# Patient Record
Sex: FEMALE | Race: WHITE | NOT HISPANIC OR LATINO | Employment: OTHER | URBAN - NONMETROPOLITAN AREA
[De-identification: names, ages, dates, MRNs, and addresses within clinical notes are randomized per-mention and may not be internally consistent; named-entity substitution may affect disease eponyms.]

---

## 2024-05-23 ENCOUNTER — OFFICE VISIT (OUTPATIENT)
Dept: CARDIOLOGY | Facility: CLINIC | Age: 64
End: 2024-05-23
Payer: COMMERCIAL

## 2024-05-23 VITALS
SYSTOLIC BLOOD PRESSURE: 136 MMHG | HEIGHT: 64 IN | HEART RATE: 71 BPM | BODY MASS INDEX: 35 KG/M2 | DIASTOLIC BLOOD PRESSURE: 74 MMHG | WEIGHT: 205 LBS | OXYGEN SATURATION: 98 %

## 2024-05-23 DIAGNOSIS — I10 ESSENTIAL HYPERTENSION: ICD-10-CM

## 2024-05-23 DIAGNOSIS — I38 VALVULAR HEART DISEASE: Primary | ICD-10-CM

## 2024-05-23 DIAGNOSIS — E78.2 MIXED HYPERLIPIDEMIA: ICD-10-CM

## 2024-05-23 PROBLEM — K21.9 GERD (GASTROESOPHAGEAL REFLUX DISEASE): Status: ACTIVE | Noted: 2024-05-23

## 2024-05-23 PROBLEM — F41.9 ANXIETY: Status: ACTIVE | Noted: 2024-05-23

## 2024-05-23 PROBLEM — E66.01 SEVERE OBESITY (BMI 35.0-39.9) WITH COMORBIDITY: Status: ACTIVE | Noted: 2024-05-23

## 2024-05-23 PROBLEM — E11.9 DM (DIABETES MELLITUS), TYPE 2: Status: ACTIVE | Noted: 2024-05-23

## 2024-05-23 PROCEDURE — 3075F SYST BP GE 130 - 139MM HG: CPT | Performed by: INTERNAL MEDICINE

## 2024-05-23 PROCEDURE — 1160F RVW MEDS BY RX/DR IN RCRD: CPT | Performed by: INTERNAL MEDICINE

## 2024-05-23 PROCEDURE — 93000 ELECTROCARDIOGRAM COMPLETE: CPT | Performed by: INTERNAL MEDICINE

## 2024-05-23 PROCEDURE — 3078F DIAST BP <80 MM HG: CPT | Performed by: INTERNAL MEDICINE

## 2024-05-23 PROCEDURE — 1159F MED LIST DOCD IN RCRD: CPT | Performed by: INTERNAL MEDICINE

## 2024-05-23 PROCEDURE — 99204 OFFICE O/P NEW MOD 45 MIN: CPT | Performed by: INTERNAL MEDICINE

## 2024-05-23 RX ORDER — METOPROLOL TARTRATE 100 MG/1
1 TABLET ORAL 2 TIMES DAILY
COMMUNITY

## 2024-05-23 RX ORDER — SPIRONOLACTONE 50 MG/1
1 TABLET, FILM COATED ORAL DAILY
COMMUNITY

## 2024-05-23 RX ORDER — ASPIRIN 325 MG
325 TABLET, DELAYED RELEASE (ENTERIC COATED) ORAL EVERY 6 HOURS PRN
COMMUNITY

## 2024-05-23 RX ORDER — IRBESARTAN 300 MG/1
1 TABLET ORAL DAILY
COMMUNITY

## 2024-05-23 RX ORDER — CLONIDINE HYDROCHLORIDE 0.1 MG/1
TABLET ORAL
COMMUNITY

## 2024-05-23 RX ORDER — PREDNISONE 10 MG/1
TABLET ORAL
COMMUNITY

## 2024-05-23 RX ORDER — ROSUVASTATIN CALCIUM 40 MG/1
TABLET, COATED ORAL
COMMUNITY
Start: 2024-04-09

## 2024-05-23 RX ORDER — LORAZEPAM 1 MG/1
TABLET ORAL
COMMUNITY

## 2024-05-23 RX ORDER — FAMOTIDINE 40 MG/1
TABLET, FILM COATED ORAL
COMMUNITY

## 2024-05-23 RX ORDER — MONTELUKAST SODIUM 10 MG/1
1 TABLET ORAL DAILY
COMMUNITY

## 2024-05-23 NOTE — PROGRESS NOTES
Reason for Visit: Mitral regurgitation.    HPI:  Lizzie Hilton is a 64 y.o. female is being seen for consultation today at the request of JANET Chacko for assistance with management of mitral regurgitation.  Recent echo from 4/24/2024 that was done secondary to cardiac murmur showed normal LV function with mild to moderate mitral regurgitation, possible bicuspid arctic valve with mild stenosis and moderate regurgitation.  She reports feeling tired for a couple of months.  She spends a lot of time daydreaming.  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  She is very active taking care of these animals.      Previous Cardiac Testing and Procedures:  -Echo (4/24/2024) EF 60%, grade 1 diastolic dysfunction, normal RV size and function, mild to moderate LA enlargement, mild to moderate MR, possible bicuspid aortic valve with mild stenosis and moderate regurgitation    Lab data:  -Lipid panel (4/9/2024) total cholesterol 115, HDL 35, LDL 46, triglycerides 167  -BMP (4/9/2024) creatinine 0.7, potassium 3.8, sodium 138  -Hemoglobin A1c (4/9/2024) 6.5%    Patient Active Problem List   Diagnosis    Essential hypertension    Valvular heart disease    Mixed hyperlipidemia    Severe obesity (BMI 35.0-39.9) with comorbidity    Anxiety    DM (diabetes mellitus), type 2    GERD (gastroesophageal reflux disease)       Social History     Tobacco Use    Smoking status: Former     Types: Cigarettes    Smokeless tobacco: Never   Vaping Use    Vaping status: Never Used   Substance Use Topics    Alcohol use: Never    Drug use: Never       History reviewed. No pertinent family history.    The following portions of the patient's history were reviewed and updated as appropriate: allergies, current medications, past family history, past medical history, past social history, past surgical history, and problem list.      Current Outpatient Medications:     cloNIDine (CATAPRES) 0.1 MG tablet, clonidine HCl 0.1 mg  "tablet daily if needed, Disp: , Rfl:     famotidine (PEPCID) 40 MG tablet, Take 1 tablet every day by oral route as directed for 90 days., Disp: , Rfl:     irbesartan (AVAPRO) 300 MG tablet, Take 1 tablet by mouth Daily., Disp: , Rfl:     LORazepam (ATIVAN) 1 MG tablet, lorazepam 1 mg tablet 1 Tablet(s) PO bid, Disp: , Rfl:     metFORMIN (GLUCOPHAGE) 500 MG tablet, Take 1 tablet by mouth Daily., Disp: , Rfl:     metoprolol tartrate (LOPRESSOR) 100 MG tablet, Take 1 tablet by mouth 2 (Two) Times a Day., Disp: , Rfl:     montelukast (SINGULAIR) 10 MG tablet, Take 1 tablet by mouth Daily., Disp: , Rfl:     predniSONE (DELTASONE) 10 MG tablet, Take 1 tablet every day by oral route as needed for 90 days., Disp: , Rfl:     rosuvastatin (CRESTOR) 40 MG tablet, Take 1 tablet every day by oral route at bedtime for 90 days., Disp: , Rfl:     spironolactone (ALDACTONE) 50 MG tablet, Take 1 tablet by mouth Daily., Disp: , Rfl:     aspirin 325 MG EC tablet, Take 1 tablet by mouth Every 6 (Six) Hours As Needed for Mild Pain., Disp: , Rfl:     Review of Systems   Constitutional: Positive for malaise/fatigue. Negative for chills and fever.   Cardiovascular:  Negative for chest pain and paroxysmal nocturnal dyspnea.   Respiratory:  Negative for cough and shortness of breath.    Skin:  Negative for rash.   Gastrointestinal:  Negative for abdominal pain and heartburn.   Neurological:  Negative for dizziness and numbness.       Objective   /74 (BP Location: Left arm, Patient Position: Sitting, Cuff Size: Adult)   Pulse 71   Ht 162.6 cm (64\")   Wt 93 kg (205 lb)   SpO2 98%   BMI 35.19 kg/m²   Constitutional:       Appearance: Well-developed.   HENT:      Head: Normocephalic and atraumatic.   Pulmonary:      Effort: Pulmonary effort is normal.      Breath sounds: Normal breath sounds.   Cardiovascular:      Normal rate. Regular rhythm.      Murmurs: There is a grade 2/6 holosystolic murmur.   Edema:     Peripheral edema " absent.   Skin:     General: Skin is warm and dry.   Neurological:      Mental Status: Alert and oriented to person, place, and time.         ECG 12 Lead    Date/Time: 5/23/2024 1:18 PM  Performed by: Kiran Clemente MD    Authorized by: Kiran Clemente MD  Previous ECG: no previous ECG available  Rhythm: sinus rhythm  Rate: normal    Clinical impression: normal ECG            ICD-10-CM ICD-9-CM   1. Valvular heart disease  I38 424.90   2. Essential hypertension  I10 401.9   3. Mixed hyperlipidemia  E78.2 272.2         Assessment/Plan:  1.  Valvular heart disease: Recent echo on 4/24/2024 showed mild to moderate MR, and possible bicuspid arctic valve with mild aortic stenosis and moderate regurgitation.  Plan for AWA in 1 year for follow-up and further evaluation possible bicuspid aortic valve.    2.  Essential hypertension: Blood pressure is borderline today.  Continue current therapy of clonidine, irbesartan, metoprolol, and spironolactone.    3.  Mixed hyperlipidemia: Lipid panel from 4/9/2024 showed reasonably good control other than low HDL and mildly elevated triglycerides.  Continue rosuvastatin.

## 2024-05-23 NOTE — LETTER
May 23, 2024     JANET Chacko  [address] [address]    Patient: Lizzie Hilton   YOB: 1960   Date of Visit: 5/23/2024       Dear Jasmin Garza,    Thank you for referring Lizzie Hilton to me for evaluation. Below is a copy of my consult note.    If you have questions, please do not hesitate to call me. I look forward to following Lizzie along with you.         Sincerely,        Kiran Clemente MD        CC: No Recipients      Reason for Visit: Mitral regurgitation.    HPI:  Lizzie Hilton is a 64 y.o. female is being seen for consultation today at the request of JANET Chacko for assistance with management of mitral regurgitation.  Recent echo from 4/24/2024 that was done secondary to cardiac murmur showed normal LV function with mild to moderate mitral regurgitation, possible bicuspid arctic valve with mild stenosis and moderate regurgitation.  She reports feeling tired for a couple of months.  She spends a lot of time daydreaming.  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  She is very active taking care of these animals.      Previous Cardiac Testing and Procedures:  -Echo (4/24/2024) EF 60%, grade 1 diastolic dysfunction, normal RV size and function, mild to moderate LA enlargement, mild to moderate MR, possible bicuspid aortic valve with mild stenosis and moderate regurgitation    Lab data:  -Lipid panel (4/9/2024) total cholesterol 115, HDL 35, LDL 46, triglycerides 167  -BMP (4/9/2024) creatinine 0.7, potassium 3.8, sodium 138  -Hemoglobin A1c (4/9/2024) 6.5%    Patient Active Problem List   Diagnosis   • Essential hypertension   • Valvular heart disease   • Mixed hyperlipidemia   • Severe obesity (BMI 35.0-39.9) with comorbidity   • Anxiety   • DM (diabetes mellitus), type 2   • GERD (gastroesophageal reflux disease)       Social History     Tobacco Use   • Smoking status: Former     Types: Cigarettes   • Smokeless tobacco: Never   Vaping Use    • Vaping status: Never Used   Substance Use Topics   • Alcohol use: Never   • Drug use: Never       History reviewed. No pertinent family history.    The following portions of the patient's history were reviewed and updated as appropriate: allergies, current medications, past family history, past medical history, past social history, past surgical history, and problem list.      Current Outpatient Medications:   •  cloNIDine (CATAPRES) 0.1 MG tablet, clonidine HCl 0.1 mg tablet daily if needed, Disp: , Rfl:   •  famotidine (PEPCID) 40 MG tablet, Take 1 tablet every day by oral route as directed for 90 days., Disp: , Rfl:   •  irbesartan (AVAPRO) 300 MG tablet, Take 1 tablet by mouth Daily., Disp: , Rfl:   •  LORazepam (ATIVAN) 1 MG tablet, lorazepam 1 mg tablet 1 Tablet(s) PO bid, Disp: , Rfl:   •  metFORMIN (GLUCOPHAGE) 500 MG tablet, Take 1 tablet by mouth Daily., Disp: , Rfl:   •  metoprolol tartrate (LOPRESSOR) 100 MG tablet, Take 1 tablet by mouth 2 (Two) Times a Day., Disp: , Rfl:   •  montelukast (SINGULAIR) 10 MG tablet, Take 1 tablet by mouth Daily., Disp: , Rfl:   •  predniSONE (DELTASONE) 10 MG tablet, Take 1 tablet every day by oral route as needed for 90 days., Disp: , Rfl:   •  rosuvastatin (CRESTOR) 40 MG tablet, Take 1 tablet every day by oral route at bedtime for 90 days., Disp: , Rfl:   •  spironolactone (ALDACTONE) 50 MG tablet, Take 1 tablet by mouth Daily., Disp: , Rfl:   •  aspirin 325 MG EC tablet, Take 1 tablet by mouth Every 6 (Six) Hours As Needed for Mild Pain., Disp: , Rfl:     Review of Systems   Constitutional: Positive for malaise/fatigue. Negative for chills and fever.   Cardiovascular:  Negative for chest pain and paroxysmal nocturnal dyspnea.   Respiratory:  Negative for cough and shortness of breath.    Skin:  Negative for rash.   Gastrointestinal:  Negative for abdominal pain and heartburn.   Neurological:  Negative for dizziness and numbness.       Objective  /74 (BP  "Location: Left arm, Patient Position: Sitting, Cuff Size: Adult)   Pulse 71   Ht 162.6 cm (64\")   Wt 93 kg (205 lb)   SpO2 98%   BMI 35.19 kg/m²   Constitutional:       Appearance: Well-developed.   HENT:      Head: Normocephalic and atraumatic.   Pulmonary:      Effort: Pulmonary effort is normal.      Breath sounds: Normal breath sounds.   Cardiovascular:      Normal rate. Regular rhythm.      Murmurs: There is a grade 2/6 holosystolic murmur.   Edema:     Peripheral edema absent.   Skin:     General: Skin is warm and dry.   Neurological:      Mental Status: Alert and oriented to person, place, and time.         ECG 12 Lead    Date/Time: 5/23/2024 1:18 PM  Performed by: Kiran Clemente MD    Authorized by: Kiran Clemente MD  Previous ECG: no previous ECG available  Rhythm: sinus rhythm  Rate: normal    Clinical impression: normal ECG            ICD-10-CM ICD-9-CM   1. Valvular heart disease  I38 424.90   2. Essential hypertension  I10 401.9   3. Mixed hyperlipidemia  E78.2 272.2         Assessment/Plan:  1.  Valvular heart disease: Recent echo on 4/24/2024 showed mild to moderate MR, and possible bicuspid arctic valve with mild aortic stenosis and moderate regurgitation.  Plan for AWA in 1 year for follow-up and further evaluation possible bicuspid aortic valve.    2.  Essential hypertension: Blood pressure is borderline today.  Continue current therapy of clonidine, irbesartan, metoprolol, and spironolactone.    3.  Mixed hyperlipidemia: Lipid panel from 4/9/2024 showed reasonably good control other than low HDL and mildly elevated triglycerides.  Continue rosuvastatin.  "

## 2024-09-16 RX ORDER — UBIDECARENONE 75 MG
200 CAPSULE ORAL DAILY
COMMUNITY

## 2024-09-16 RX ORDER — ROSUVASTATIN CALCIUM 40 MG/1
40 TABLET, COATED ORAL
COMMUNITY
Start: 2024-04-09

## 2024-09-16 RX ORDER — IRBESARTAN 300 MG/1
300 TABLET ORAL DAILY
COMMUNITY

## 2024-09-16 RX ORDER — CETIRIZINE HYDROCHLORIDE 10 MG/1
10 TABLET ORAL DAILY
COMMUNITY

## 2024-09-16 RX ORDER — METOPROLOL TARTRATE 100 MG
100 TABLET ORAL 2 TIMES DAILY
COMMUNITY

## 2024-09-16 RX ORDER — FAMOTIDINE 40 MG/1
40 TABLET, FILM COATED ORAL DAILY
COMMUNITY

## 2024-09-16 RX ORDER — CLONIDINE HYDROCHLORIDE 0.1 MG/1
0.1 TABLET ORAL PRN
COMMUNITY

## 2024-09-16 RX ORDER — MONTELUKAST SODIUM 10 MG/1
10 TABLET ORAL DAILY
COMMUNITY

## 2024-09-16 RX ORDER — RIZATRIPTAN BENZOATE 10 MG/1
10 TABLET, ORALLY DISINTEGRATING ORAL
COMMUNITY

## 2024-09-16 RX ORDER — LORAZEPAM 1 MG/1
1 TABLET ORAL EVERY 8 HOURS PRN
COMMUNITY

## 2024-09-16 RX ORDER — SPIRONOLACTONE 50 MG/1
50 TABLET, FILM COATED ORAL DAILY
COMMUNITY

## 2024-09-16 RX ORDER — PREDNISONE 10 MG/1
10 TABLET ORAL PRN
COMMUNITY

## 2024-09-24 ENCOUNTER — OFFICE VISIT (OUTPATIENT)
Dept: GASTROENTEROLOGY | Age: 64
End: 2024-09-24
Payer: COMMERCIAL

## 2024-09-24 VITALS
HEIGHT: 65 IN | SYSTOLIC BLOOD PRESSURE: 120 MMHG | DIASTOLIC BLOOD PRESSURE: 80 MMHG | OXYGEN SATURATION: 98 % | HEART RATE: 76 BPM | WEIGHT: 193 LBS | BODY MASS INDEX: 32.15 KG/M2

## 2024-09-24 DIAGNOSIS — K92.1 BLOOD IN STOOL: Primary | ICD-10-CM

## 2024-09-24 PROCEDURE — 99204 OFFICE O/P NEW MOD 45 MIN: CPT | Performed by: NURSE PRACTITIONER

## 2024-09-24 NOTE — PROGRESS NOTES
Subjective:     Patient ID: Marcy Bonilla is a 64 y.o. female  PCP: Davi Murphy MD  Referring Provider: Madalyn Okeefe APRN - CNP    HPI  Patient presents to the office today with the following complaints: New Patient and Hematochezia      Patient seen in the office today referred due to blood in her stool  Reports she was sick about 1 month ago and had blood in her diarrhea   States she is no longer seeing blood in her stool    We discussed a colonoscopy and she declines this at this time states she has a Cologuard test at home that she will do     Assessment:     1. Blood in stool       Review of Systems   Constitutional:  Negative for activity change, appetite change, fatigue, fever and unexpected weight change.   HENT:  Negative for trouble swallowing.    Respiratory:  Negative for cough, choking and shortness of breath.    Cardiovascular:  Negative for chest pain.   Gastrointestinal:  Negative for abdominal distention, abdominal pain, anal bleeding, blood in stool, constipation, diarrhea, nausea, rectal pain and vomiting.   Allergic/Immunologic: Negative for food allergies.   All other systems reviewed and are negative.      Plan:   Follow up as needed   Orders  No orders of the defined types were placed in this encounter.    Medications  No orders of the defined types were placed in this encounter.        Patient History:     No past medical history on file.    Past Surgical History:   Procedure Laterality Date     SECTION      x2       Family History   Adopted: Yes   Problem Relation Age of Onset    Ulcerative Colitis Daughter        Social History     Socioeconomic History    Marital status: Unknown   Tobacco Use    Smoking status: Never    Smokeless tobacco: Never   Vaping Use    Vaping status: Never Used   Substance and Sexual Activity    Alcohol use: Never    Drug use: Never     Social Determinants of Health      Received from [facility]    Family and Community Support

## 2024-10-01 ASSESSMENT — ENCOUNTER SYMPTOMS
SHORTNESS OF BREATH: 0
VOMITING: 0
NAUSEA: 0
TROUBLE SWALLOWING: 0
RECTAL PAIN: 0
CONSTIPATION: 0
DIARRHEA: 0
ANAL BLEEDING: 0
COUGH: 0
BLOOD IN STOOL: 0
ABDOMINAL PAIN: 0
CHOKING: 0
ABDOMINAL DISTENTION: 0